# Patient Record
Sex: FEMALE | Race: BLACK OR AFRICAN AMERICAN | Employment: UNEMPLOYED | URBAN - METROPOLITAN AREA
[De-identification: names, ages, dates, MRNs, and addresses within clinical notes are randomized per-mention and may not be internally consistent; named-entity substitution may affect disease eponyms.]

---

## 2021-07-30 ENCOUNTER — ANESTHESIA EVENT (OUTPATIENT)
Dept: LABOR AND DELIVERY | Age: 31
DRG: 560 | End: 2021-07-30
Payer: MEDICAID

## 2021-07-30 ENCOUNTER — ANESTHESIA (OUTPATIENT)
Dept: LABOR AND DELIVERY | Age: 31
DRG: 560 | End: 2021-07-30
Payer: MEDICAID

## 2021-07-30 ENCOUNTER — HOSPITAL ENCOUNTER (INPATIENT)
Age: 31
LOS: 2 days | Discharge: HOME | DRG: 560 | End: 2021-08-01
Attending: OBSTETRICS & GYNECOLOGY | Admitting: OBSTETRICS & GYNECOLOGY
Payer: MEDICAID

## 2021-07-30 PROBLEM — Z34.90 PREGNANCY: Status: ACTIVE | Noted: 2021-07-30

## 2021-07-30 PROBLEM — Z37.9 NORMAL LABOR: Status: ACTIVE | Noted: 2021-07-30

## 2021-07-30 LAB
A1 MICROGLOB PLACENTAL VAG QL: POSITIVE
ABO + RH BLD: NORMAL
ALBUMIN SERPL-MCNC: 2.4 G/DL (ref 3.5–5)
ALBUMIN/GLOB SERPL: 0.6 {RATIO} (ref 1.2–3.5)
ALP SERPL-CCNC: 79 U/L (ref 50–130)
ALT SERPL-CCNC: 15 U/L (ref 12–65)
AMPHET UR QL SCN: NEGATIVE
ANION GAP SERPL CALC-SCNC: 7 MMOL/L (ref 7–16)
AST SERPL-CCNC: 14 U/L (ref 15–37)
BARBITURATES UR QL SCN: NEGATIVE
BASE EXCESS BLD CALC-SCNC: 0.7 MMOL/L
BENZODIAZ UR QL: NEGATIVE
BILIRUB SERPL-MCNC: 0.2 MG/DL (ref 0.2–1.1)
BLOOD GROUP ANTIBODIES SERPL: NORMAL
BUN SERPL-MCNC: 9 MG/DL (ref 6–23)
CALCIUM SERPL-MCNC: 9 MG/DL (ref 8.3–10.4)
CANNABINOIDS UR QL SCN: POSITIVE
CHLORIDE SERPL-SCNC: 107 MMOL/L (ref 98–107)
CO2 SERPL-SCNC: 25 MMOL/L (ref 21–32)
COCAINE UR QL SCN: NEGATIVE
CONTROL LINE PRESENT?: NORMAL
CREAT SERPL-MCNC: 0.98 MG/DL (ref 0.6–1)
ERYTHROCYTE [DISTWIDTH] IN BLOOD BY AUTOMATED COUNT: 15.6 % (ref 11.9–14.6)
EXPIRATION DATE: NORMAL
GLOBULIN SER CALC-MCNC: 3.8 G/DL (ref 2.3–3.5)
GLUCOSE SERPL-MCNC: 85 MG/DL (ref 65–100)
HCO3 BLD-SCNC: 28 MMOL/L (ref 22–26)
HCT VFR BLD AUTO: 27.8 % (ref 35.8–46.3)
HGB BLD-MCNC: 9.4 G/DL (ref 11.7–15.4)
INTERNAL NEGATIVE CONTROL: NORMAL
KIT LOT NO.: NORMAL
MCH RBC QN AUTO: 27.4 PG (ref 26.1–32.9)
MCHC RBC AUTO-ENTMCNC: 33.8 G/DL (ref 31.4–35)
MCV RBC AUTO: 81 FL (ref 79.6–97.8)
METHADONE UR QL: NEGATIVE
NRBC # BLD: 0 K/UL (ref 0–0.2)
OPIATES UR QL: NEGATIVE
PCO2 BLDCO: 55 MMHG (ref 32–68)
PCP UR QL: NEGATIVE
PH BLDCO: 7.32 [PH] (ref 7.15–7.38)
PLATELET # BLD AUTO: 305 K/UL (ref 150–450)
PMV BLD AUTO: 10.5 FL (ref 9.4–12.3)
PO2 BLDCO: 14 MMHG
POTASSIUM SERPL-SCNC: 4.2 MMOL/L (ref 3.5–5.1)
PROT SERPL-MCNC: 6.2 G/DL (ref 6.3–8.2)
RBC # BLD AUTO: 3.43 M/UL (ref 4.05–5.2)
SAO2 % BLD: 14.4 % (ref 95–98)
SERVICE CMNT-IMP: ABNORMAL
SODIUM SERPL-SCNC: 139 MMOL/L (ref 136–145)
SPECIMEN EXP DATE BLD: NORMAL
SPECIMEN TYPE: ABNORMAL
WBC # BLD AUTO: 13.8 K/UL (ref 4.3–11.1)

## 2021-07-30 PROCEDURE — 77030014125 HC TY EPDRL BBMI -B: Performed by: ANESTHESIOLOGY

## 2021-07-30 PROCEDURE — 74011250636 HC RX REV CODE- 250/636: Performed by: ANESTHESIOLOGY

## 2021-07-30 PROCEDURE — 76060000078 HC EPIDURAL ANESTHESIA: Performed by: OBSTETRICS & GYNECOLOGY

## 2021-07-30 PROCEDURE — 84112 EVAL AMNIOTIC FLUID PROTEIN: CPT | Performed by: OBSTETRICS & GYNECOLOGY

## 2021-07-30 PROCEDURE — 65270000029 HC RM PRIVATE

## 2021-07-30 PROCEDURE — 75410000000 HC DELIVERY VAGINAL/SINGLE: Performed by: OBSTETRICS & GYNECOLOGY

## 2021-07-30 PROCEDURE — 82803 BLOOD GASES ANY COMBINATION: CPT

## 2021-07-30 PROCEDURE — 74011250636 HC RX REV CODE- 250/636: Performed by: OBSTETRICS & GYNECOLOGY

## 2021-07-30 PROCEDURE — 74011250637 HC RX REV CODE- 250/637: Performed by: OBSTETRICS & GYNECOLOGY

## 2021-07-30 PROCEDURE — 74011000250 HC RX REV CODE- 250: Performed by: ANESTHESIOLOGY

## 2021-07-30 PROCEDURE — 00HU33Z INSERTION OF INFUSION DEVICE INTO SPINAL CANAL, PERCUTANEOUS APPROACH: ICD-10-PCS | Performed by: ANESTHESIOLOGY

## 2021-07-30 PROCEDURE — 80053 COMPREHEN METABOLIC PANEL: CPT

## 2021-07-30 PROCEDURE — 75410000002 HC LABOR FEE PER 1 HR: Performed by: OBSTETRICS & GYNECOLOGY

## 2021-07-30 PROCEDURE — 75410000003 HC RECOV DEL/VAG/CSECN EA 0.5 HR: Performed by: OBSTETRICS & GYNECOLOGY

## 2021-07-30 PROCEDURE — 86901 BLOOD TYPING SEROLOGIC RH(D): CPT

## 2021-07-30 PROCEDURE — 80307 DRUG TEST PRSMV CHEM ANLYZR: CPT

## 2021-07-30 PROCEDURE — A4300 CATH IMPL VASC ACCESS PORTAL: HCPCS | Performed by: ANESTHESIOLOGY

## 2021-07-30 PROCEDURE — 99285 EMERGENCY DEPT VISIT HI MDM: CPT

## 2021-07-30 PROCEDURE — 74011000258 HC RX REV CODE- 258: Performed by: OBSTETRICS & GYNECOLOGY

## 2021-07-30 PROCEDURE — 85027 COMPLETE CBC AUTOMATED: CPT

## 2021-07-30 PROCEDURE — 74011250636 HC RX REV CODE- 250/636: Performed by: NURSE ANESTHETIST, CERTIFIED REGISTERED

## 2021-07-30 PROCEDURE — 59025 FETAL NON-STRESS TEST: CPT

## 2021-07-30 PROCEDURE — 36415 COLL VENOUS BLD VENIPUNCTURE: CPT

## 2021-07-30 RX ORDER — LANOLIN ALCOHOL/MO/W.PET/CERES
1 CREAM (GRAM) TOPICAL
Status: DISCONTINUED | OUTPATIENT
Start: 2021-07-31 | End: 2021-08-01 | Stop reason: HOSPADM

## 2021-07-30 RX ORDER — ROPIVACAINE HYDROCHLORIDE 2 MG/ML
INJECTION, SOLUTION EPIDURAL; INFILTRATION; PERINEURAL
Status: DISCONTINUED | OUTPATIENT
Start: 2021-07-30 | End: 2021-07-30 | Stop reason: HOSPADM

## 2021-07-30 RX ORDER — IBUPROFEN 400 MG/1
400 TABLET ORAL
Status: DISCONTINUED | OUTPATIENT
Start: 2021-07-30 | End: 2021-08-01 | Stop reason: HOSPADM

## 2021-07-30 RX ORDER — NALOXONE HYDROCHLORIDE 0.4 MG/ML
0.4 INJECTION, SOLUTION INTRAMUSCULAR; INTRAVENOUS; SUBCUTANEOUS AS NEEDED
Status: DISCONTINUED | OUTPATIENT
Start: 2021-07-30 | End: 2021-08-01 | Stop reason: HOSPADM

## 2021-07-30 RX ORDER — ONDANSETRON 4 MG/1
4 TABLET, ORALLY DISINTEGRATING ORAL
Status: ACTIVE | OUTPATIENT
Start: 2021-07-30 | End: 2021-07-31

## 2021-07-30 RX ORDER — SODIUM CHLORIDE 0.9 % (FLUSH) 0.9 %
5-40 SYRINGE (ML) INJECTION EVERY 8 HOURS
Status: DISCONTINUED | OUTPATIENT
Start: 2021-07-30 | End: 2021-07-30

## 2021-07-30 RX ORDER — OXYTOCIN/RINGER'S LACTATE 30/500 ML
0-20 PLASTIC BAG, INJECTION (ML) INTRAVENOUS
Status: DISCONTINUED | OUTPATIENT
Start: 2021-07-30 | End: 2021-07-30 | Stop reason: SDUPTHER

## 2021-07-30 RX ORDER — OXYTOCIN/RINGER'S LACTATE 30/500 ML
87.3 PLASTIC BAG, INJECTION (ML) INTRAVENOUS AS NEEDED
Status: DISCONTINUED | OUTPATIENT
Start: 2021-07-30 | End: 2021-08-01 | Stop reason: HOSPADM

## 2021-07-30 RX ORDER — EPHEDRINE SULFATE/0.9% NACL/PF 50 MG/5 ML
SYRINGE (ML) INTRAVENOUS AS NEEDED
Status: DISCONTINUED | OUTPATIENT
Start: 2021-07-30 | End: 2021-07-30 | Stop reason: HOSPADM

## 2021-07-30 RX ORDER — MINERAL OIL
120 OIL (ML) ORAL
Status: COMPLETED | OUTPATIENT
Start: 2021-07-30 | End: 2021-07-30

## 2021-07-30 RX ORDER — ONDANSETRON 2 MG/ML
4 INJECTION INTRAMUSCULAR; INTRAVENOUS
Status: DISCONTINUED | OUTPATIENT
Start: 2021-07-30 | End: 2021-07-30 | Stop reason: HOSPADM

## 2021-07-30 RX ORDER — DIPHENHYDRAMINE HCL 25 MG
25 CAPSULE ORAL
Status: DISCONTINUED | OUTPATIENT
Start: 2021-07-30 | End: 2021-08-01 | Stop reason: HOSPADM

## 2021-07-30 RX ORDER — ROPIVACAINE HYDROCHLORIDE 5 MG/ML
INJECTION, SOLUTION EPIDURAL; INFILTRATION; PERINEURAL AS NEEDED
Status: DISCONTINUED | OUTPATIENT
Start: 2021-07-30 | End: 2021-07-30 | Stop reason: HOSPADM

## 2021-07-30 RX ORDER — SODIUM CHLORIDE 0.9 % (FLUSH) 0.9 %
5-40 SYRINGE (ML) INJECTION AS NEEDED
Status: DISCONTINUED | OUTPATIENT
Start: 2021-07-30 | End: 2021-07-30

## 2021-07-30 RX ORDER — HYDROCODONE BITARTRATE AND ACETAMINOPHEN 5; 325 MG/1; MG/1
1 TABLET ORAL
Status: DISCONTINUED | OUTPATIENT
Start: 2021-07-30 | End: 2021-08-01 | Stop reason: HOSPADM

## 2021-07-30 RX ORDER — SODIUM CHLORIDE 0.9 % (FLUSH) 0.9 %
5-40 SYRINGE (ML) INJECTION EVERY 8 HOURS
Status: DISCONTINUED | OUTPATIENT
Start: 2021-07-30 | End: 2021-08-01 | Stop reason: HOSPADM

## 2021-07-30 RX ORDER — DEXTROSE, SODIUM CHLORIDE, SODIUM LACTATE, POTASSIUM CHLORIDE, AND CALCIUM CHLORIDE 5; .6; .31; .03; .02 G/100ML; G/100ML; G/100ML; G/100ML; G/100ML
125 INJECTION, SOLUTION INTRAVENOUS CONTINUOUS
Status: DISCONTINUED | OUTPATIENT
Start: 2021-07-30 | End: 2021-07-30

## 2021-07-30 RX ORDER — LIDOCAINE HYDROCHLORIDE 10 MG/ML
1 INJECTION INFILTRATION; PERINEURAL
Status: DISCONTINUED | OUTPATIENT
Start: 2021-07-30 | End: 2021-07-30 | Stop reason: HOSPADM

## 2021-07-30 RX ORDER — ACETAMINOPHEN 325 MG/1
650 TABLET ORAL
Status: DISCONTINUED | OUTPATIENT
Start: 2021-07-30 | End: 2021-08-01 | Stop reason: HOSPADM

## 2021-07-30 RX ORDER — SODIUM CHLORIDE 0.9 % (FLUSH) 0.9 %
5-40 SYRINGE (ML) INJECTION AS NEEDED
Status: DISCONTINUED | OUTPATIENT
Start: 2021-07-30 | End: 2021-08-01 | Stop reason: HOSPADM

## 2021-07-30 RX ORDER — DEXTROSE, SODIUM CHLORIDE, SODIUM LACTATE, POTASSIUM CHLORIDE, AND CALCIUM CHLORIDE 5; .6; .31; .03; .02 G/100ML; G/100ML; G/100ML; G/100ML; G/100ML
125 INJECTION, SOLUTION INTRAVENOUS CONTINUOUS
Status: DISCONTINUED | OUTPATIENT
Start: 2021-07-30 | End: 2021-08-01 | Stop reason: HOSPADM

## 2021-07-30 RX ORDER — MORPHINE SULFATE 10 MG/ML
5 INJECTION, SOLUTION INTRAMUSCULAR; INTRAVENOUS
Status: DISCONTINUED | OUTPATIENT
Start: 2021-07-30 | End: 2021-08-01 | Stop reason: HOSPADM

## 2021-07-30 RX ORDER — DOCUSATE SODIUM 100 MG/1
100 CAPSULE, LIQUID FILLED ORAL 2 TIMES DAILY
Status: DISCONTINUED | OUTPATIENT
Start: 2021-07-30 | End: 2021-08-01 | Stop reason: HOSPADM

## 2021-07-30 RX ORDER — BUTORPHANOL TARTRATE 2 MG/ML
1 INJECTION INTRAMUSCULAR; INTRAVENOUS
Status: DISCONTINUED | OUTPATIENT
Start: 2021-07-30 | End: 2021-07-30 | Stop reason: HOSPADM

## 2021-07-30 RX ORDER — BUTORPHANOL TARTRATE 2 MG/ML
1 INJECTION INTRAMUSCULAR; INTRAVENOUS
Status: DISCONTINUED | OUTPATIENT
Start: 2021-07-30 | End: 2021-07-30

## 2021-07-30 RX ORDER — OXYTOCIN/RINGER'S LACTATE 30/500 ML
10 PLASTIC BAG, INJECTION (ML) INTRAVENOUS AS NEEDED
Status: DISCONTINUED | OUTPATIENT
Start: 2021-07-30 | End: 2021-08-01 | Stop reason: HOSPADM

## 2021-07-30 RX ORDER — OXYTOCIN/RINGER'S LACTATE 30/500 ML
0-20 PLASTIC BAG, INJECTION (ML) INTRAVENOUS
Status: DISCONTINUED | OUTPATIENT
Start: 2021-07-30 | End: 2021-08-01 | Stop reason: HOSPADM

## 2021-07-30 RX ORDER — PRENATAL VIT 96/IRON FUM/FOLIC 27MG-0.8MG
1 TABLET ORAL DAILY
Status: DISCONTINUED | OUTPATIENT
Start: 2021-07-31 | End: 2021-08-01 | Stop reason: HOSPADM

## 2021-07-30 RX ORDER — LIDOCAINE HYDROCHLORIDE 20 MG/ML
JELLY TOPICAL
Status: DISCONTINUED | OUTPATIENT
Start: 2021-07-30 | End: 2021-07-30 | Stop reason: HOSPADM

## 2021-07-30 RX ADMIN — ROPIVACAINE HYDROCHLORIDE 13 MG: 5 INJECTION, SOLUTION EPIDURAL; INFILTRATION; PERINEURAL at 03:50

## 2021-07-30 RX ADMIN — ACETAMINOPHEN 650 MG: 325 TABLET, FILM COATED ORAL at 20:53

## 2021-07-30 RX ADMIN — SODIUM CHLORIDE 2.5 MILLION UNITS: 9 INJECTION, SOLUTION INTRAVENOUS at 10:33

## 2021-07-30 RX ADMIN — SODIUM CHLORIDE 2.5 MILLION UNITS: 9 INJECTION, SOLUTION INTRAVENOUS at 06:27

## 2021-07-30 RX ADMIN — SODIUM CHLORIDE 5 MILLION UNITS: 900 INJECTION INTRAVENOUS at 02:35

## 2021-07-30 RX ADMIN — IBUPROFEN 400 MG: 400 TABLET, FILM COATED ORAL at 18:06

## 2021-07-30 RX ADMIN — DOCUSATE SODIUM 100 MG: 100 CAPSULE ORAL at 18:03

## 2021-07-30 RX ADMIN — MINERAL OIL 120 ML: 1000 LIQUID ORAL at 11:40

## 2021-07-30 RX ADMIN — Medication 1 MILLI-UNITS/MIN: at 07:21

## 2021-07-30 RX ADMIN — Medication 5 MG: at 04:10

## 2021-07-30 RX ADMIN — ROPIVACAINE HYDROCHLORIDE 10 ML/HR: 2 INJECTION, SOLUTION EPIDURAL; INFILTRATION at 03:48

## 2021-07-30 RX ADMIN — Medication 10 MG: at 04:05

## 2021-07-30 NOTE — PROGRESS NOTES
SW consult received as patient is unassigned and UDS + for [facility] on admission. Phone call to RN to request that UDS be ordered for  after delivery. Patient is in labor at this time.  will follow-up with patient after delivery.     EMILY Nathan  [address] [facility]   [phone]

## 2021-07-30 NOTE — PROGRESS NOTES
Pt is tolerating labor well with epidural in place. She has no complaints. IV PCN is infusing. FHR is reactive. Ctx are every 5 minutes. Cervix is 3cm. Will start pitocin augmentation per the protocol.

## 2021-07-30 NOTE — PROGRESS NOTES
Received care of pt from [address] [place] [address].   Pt comfortable with epidural.

## 2021-07-30 NOTE — PROGRESS NOTES
C/o increase pressure  Dr. Matteo Zamora at bedside  SVE per MD c/c/+2  Orders to set up for vaginal delivery

## 2021-07-30 NOTE — PROGRESS NOTES
Patient presents to triage via EMS with complaints of SROM clear fluid around 2230. Patient states she has received prenatal care in [place]. Patient reports good fetal movement and irregular contractions. States her GBS was positive.

## 2021-07-30 NOTE — ANESTHESIA PROCEDURE NOTES
Epidural Block    Patient location during procedure: OB  Start time: 7/30/2021 3:43 AM  End time: 7/30/2021 3:50 AM  Reason for block: labor epidural  Staffing  Performed: attending   Anesthesiologist: Torrey Thomas MD  Preanesthetic Checklist  Completed: patient identified, risks and benefits discussed, surgical consent, pre-op evaluation and timeout performed  Block Placement  Patient position: sitting  Prep: ChloraPrep  Sterility prep: cap, drape, gloves, hand and mask  Sedation level: no sedation  Patient monitoring: continuous pulse oximetry and heart rate  Approach: midline  Location: lumbar  Lumbar location: L3-L4  Epidural  Loss of resistance technique: saline  Guidance: landmark technique  Needle  Needle type: Tuohy   Needle gauge: 17 G  Needle length: 10 cm  Needle insertion depth: 5 cm  Catheter type: end hole  Catheter size: 19 G  Catheter at skin depth: 11 cm  Catheter securement method: clear occlusive dressing, liquid medical adhesive and surgical tape  Test dose: negative  Assessment  Block outcome: pain improved  Number of attempts: 1  Procedure assessment: patient tolerated procedure well with no immediate complications

## 2021-07-30 NOTE — PROGRESS NOTES
Pt admitted to room 432. EFM on and tracing. IV started, labs collected and sent. Consents signed. POC reviewed. Bed low and locked, call light within reach. FOB at bedside. No needs/concerns at this time.

## 2021-07-30 NOTE — ROUTINE PROCESS
SBAR OUT Report: Mother    Verbal report given to BONG Gutierrez RN on this patient, who is now being transferred to MI (unit) for routine progression of care. The patient is not wearing a green \"Anesthesia-Duramorph\" band. Report consisted of patient's Situation, Background, Assessment and Recommendations (SBAR). [place] ID bands were compared with the identification form, and verified with the patient and receiving nurse. Information from the SBAR and [place] and the [address] [facility] Report was reviewed with the receiving nurse; opportunity for questions and clarification provided.

## 2021-07-30 NOTE — H&P
History & Physical    Name: Pieter Schwarz MRN: [id number]  SSN: [id number]    YOB: 1990  Age: 33 y.o. Sex: female        Subjective: Pt is 32 y/o  at 39w4d who presents with SROM 2 hours ago and irregular and mild uterine ctx. Pt notes good FM. She denies VB, fevers, chills, UTI, PEC, or C-19 symptoms. Pt states that she has had SVDs , uncomplicated, and at term times 6. Pt's prenatal care has been in [place], [place]. Pt has her prenatal labs on her phone ([address]) and they are unremarkable other than anemia and an equivocal rubella titer. Estimated Date of Delivery: 21  OB History    Para Term  AB Living   8 7 7     7   SAB TAB Ectopic Molar Multiple Live Births                    # Outcome Date GA Lbr Gabino/2nd Weight Sex Delivery Anes PTL Lv   8 Current            7 Term            6 Term            5 Term            4 Term            3 Term            2 Term            1 Term                   Please see prenatal records for details. No past medical history on file. No past surgical history on file. Social History     Occupational History    Not on file   Tobacco Use    Smoking status: Not on file   Substance and Sexual Activity    Alcohol use: Not on file    Drug use: Not on file    Sexual activity: Not on file     No family history on file. No Known Allergies  Prior to Admission medications    Medication Sig Start Date End Date Taking? Authorizing Provider   PNV Comb #2-Iron-FA-Omega 3 29-1-400 mg cmpk Take  by mouth. Yes Provider, Historical   Iron BisGl &PS [id number]-Ca 151-46-34-9 mg-mg-mcg-mg cap Take  by mouth.    Yes Provider, Historical        Review of Systems:  Constitutional:No headache, fever  Cardiac:   No chest pain      Resp: No cough or shortness of breath     GI:   No nausea/vomiting, diarrhea, abdominal pain    :   No dysuria  Neuro:     No vision changes, headache      Objective:     Vitals:  Vitals:    21 0056 21 0058   BP: 125/73    Pulse: 93    Resp: 20    Weight:  108 kg (238 lb)   Height:  5' 9\" (1.753 m)        Physical Exam:  Patient without distress. Heart: Regular rate and rhythm  Lung: clear to auscultation throughout lung fields, no wheezes, no rales, no rhonchi and normal respiratory effort  Back: costovertebral angle tenderness absent  Abdomen: soft, nontender  Fundus: soft and non tender  Cervical Exam: 2 cm dilated    50% effaced    -2 station    Presenting Part: cephalic  Lower Extremities:  - Edema No  Membranes:  Spontaneous Rupture of Membranes; Amniotic Fluid: clear fluid  Fetal Heart Rate: Baseline: 140 per minute  Variability: moderate  Accelerations: yes  Decelerations: variable  Uterine contractions: regular, every 4-5 minutes    Prenatal Labs:   No results found for: RUBELLAEXT, GRBSEXT, HBSAGEXT, HIVEXT, RPREXT, GONNOEXT, CHLAMEXT      Assessment/Plan:     Ms. Raji Long is a [phone] seen with pregnancy at 39w4d for Presumptive ROM . GBS is positive    Plan:     Admit for labor management. Start IV PCN per the GBS protocol. Obtain pt's prenatal records.

## 2021-07-30 NOTE — ANESTHESIA PREPROCEDURE EVALUATION
Relevant Problems   No relevant active problems       Anesthetic History   No history of anesthetic complications            Review of Systems / Medical History  Patient summary reviewed and pertinent labs reviewed    Pulmonary  Within defined limits                 Neuro/Psych   Within defined limits           Cardiovascular                  Exercise tolerance: >4 METS     GI/Hepatic/Renal  Within defined limits              Endo/Other  Within defined limits           Other Findings              Physical Exam    Airway  Mallampati: I  TM Distance: 4 - 6 cm  Neck ROM: normal range of motion   Mouth opening: Normal     Cardiovascular  Regular rate and rhythm,  S1 and S2 normal,  no murmur, click, rub, or gallop  Rhythm: regular  Rate: normal         Dental  No notable dental hx       Pulmonary  Breath sounds clear to auscultation               Abdominal  GI exam deferred       Other Findings            Anesthetic Plan    ASA: 2  Anesthesia type: epidural            Anesthetic plan and risks discussed with: Patient

## 2021-07-30 NOTE — PROGRESS NOTES
1440:  SW met with patient while social distancing w/appropriate PPE. Patient states that she received prenatal care with Dr. Alhaji Lloyd. [facility]). Patient reports that she lives in [place], but traveled to [place] for prenatal care as [facility] accepted my insurance. \"  SW'r educated patient that [facility] accepts [employer] as well. Patient reports consistent prenatal care throughout pregnancy. Patient was informed that her UDS on admission was positive for THC. UDS on  awaiting urine sample. Umbilical drug screen pending. Patient states that she hasn't used marijuana in 3 months. She used marijuana because \"it was the only way I could eat. \"  Patient denies any history/current involvement of DSS. Patient was informed that a DSS report would be made today. Patient reports having a car seat, crib, and all needed items to care for . Patient does not receive WIC. Verbal education/pamphlet provided on [facility] program.  Patient receives SNAP/Food [place]. Patient has a total of 7 children whom are in the same household in [place]:  Boone Parents (04)  [place] (07)  Jose Guadalupe Cohn (10/23/08)  Burgess Prader (11/09/10)  Mohinder Nur (12)  Jud Brambila (16)  Arina Dee (21) - No name provided for [address] [facility]  [phone] - patient cell    1500:  Phone call to [employer] at [phone]. Report provided to [place]. Additionally, [place] was provided with phone # for weekend  Sierra Rasmussen, [phone]). 1620:  Second phone call placed to [employer] at [phone]. Update provided to Shelia that baby's UDS was positive for THC.     BARTOLO Ellington-BUNNY  [address] [facility]   [phone]

## 2021-07-30 NOTE — PROGRESS NOTES
SBAR IN Report: Mother    Verbal report received from Lewis Holter, RN  on this patient, who is now being transferred from L&D  for routine progression of care. The patient is not wearing a green \"Anesthesia-Duramorph\" band. Report consisted of patient's Situation, Background, Assessment and Recommendations (SBAR). [place] ID bands were compared with the identification form, and verified with the patient and transferring nurse. Information from the SBAR and the [place] Report was reviewed with the transferring nurse; opportunity for questions and clarification provided.

## 2021-07-30 NOTE — PROGRESS NOTES
Admission assessment complete as noted. Patient oriented to room and unit. Plan of care reviewed and patient verbalizes understanding. Questions encouraged and answered. Patent encouraged to call for needs or concerns. Safety Teaching reviewed:   1. Hand hygiene prior to handling the infant. 2. Use of bulb syringe. 3. Bracelets with matching numbers are placed on mother and infant  4. An infant security tag  [facility]) is placed on the infant's ankle and monitored  5. All OB nurses wear pink Employee badges - do not give your baby to anyone without proper identification. 6. Never leave the baby alone in the room. 7. The infant should be placed on their back to sleep. on a firm mattress. No toys should be placed in the crib. (safe sleep video offered to view)  8. Never shake the baby (video offered to view)  9. Infant fall prevention - do not sleep with the baby, and place the baby in the crib while ambulating. 8. Mother and Baby Care booklet given to Mother.

## 2021-07-30 NOTE — L&D DELIVERY NOTE
Patient progressed with pitocin augmentation to completely dilated. Received adequate gbs prophylaxis. Had forebag even with previous srom. forebag ruptured with allis clamp. C/C/+3. Patient delivered OA over IP with pushing over 3 contractions. Body delivered easily. Mouth and nares bulb suctioned and baby dried. Vigorous female. Delayed cord clamping performed and baby taken to warmer at mothers request. Placenta s/i/3vc. No vaginal or cervical laceration. Bleeding minimal. Pitocin given at cord clamp. Delivery Summary    Patient: Bong Euceda MRN: [id number]  SSN: [id number]    YOB: 1990  Age: 33 y.o. Sex: female       Information for the patient's :  Caesar Magana [[id number]]       Labor Events:    Labor: No    Steroids: None   Cervical Ripening Date/Time:       Cervical Ripening Type: None   Antibiotics During Labor: Yes   Rupture Identifier:      Rupture Date/Time: 2021 10:30 PM   Rupture Type: SROM   Amniotic Fluid Volume:      Amniotic Fluid Description: Clear    Amniotic Fluid Odor: None    Induction:         Induction Date/Time:        Indications for Induction:      Augmentation:     Augmentation Date/Time:      Indications for Augmentation:     Labor complications:          Additional complications:        Delivery Events:  Indications For Episiotomy:     Episiotomy:     Perineal Laceration(s):     Repaired:     Periurethral Laceration Location:      Repaired:     Labial Laceration Location:     Repaired:     Sulcal Laceration Location:     Repaired:     Vaginal Laceration Location:     Repaired:     Cervical Laceration Location:     Repaired:     Repair Suture:     Number of Repair Packets:     Estimated Blood Loss (ml):  ml   Quantitative Blood Loss (ml)                Delivery Date: 2021    Delivery Time: 11:36 AM  Delivery Type: Vaginal, Spontaneous  Sex:  Female    Gestational Age: 43w3d   Delivery Clinician:  Rosana Cabrera Seamus  Living Status: Living   Delivery Location: L&D 432          APGARS  One minute Five minutes Ten minutes   Skin color: 1   1        Heart rate: 2   2        Grimace: 2   2        Muscle tone: 2   2        Breathin   2        Totals: 9   9            Presentation: Vertex    Position:        Resuscitation Method:  Suctioning-bulb; Tactile Stimulation     Meconium Stained:        Cord Information: 3 Vessels  Complications:    Cord around:    Delayed cord clamping? Yes  Cord clamped date/time:   Disposition of Cord Blood: Lab    Blood Gases Sent?:      Placenta:  Date/Time: 2021 11:39 AM  Removal: Spontaneous      Appearance: Normal     Casanova Measurements:  Birth Weight: 2.96 kg      Birth Length: 50 cm      Head Circumference: 34 cm      Chest Circumference: 32 cm     Abdominal Girth: Other Providers:   Chapo MCINTYRE JOCELYN R;COLLEEN DELACRUZ;CHIP SMITH;YAYO MACARIO, Obstetrician;Primary Nurse;Staff Nurse;Charge Nurse;Scrub Tech           Group B Strep: No results found for: GRBSEXT, GRBSEXT  Information for the patient's :  Gurpreet Liao [[id number]]     Lab Results   Component Value Date/Time    ABO/Rh(D) PENDING 2021 11:36 AM    TANMAY IgG NEG 2021 11:36 AM      Recent Labs     21  1155   PCO2CB 55   PO2CB 14   HCO3I 28.0*   SO2I 14.4*   SPECTI ARTERIAL CORD   PHICB 7.32      Arleen Bryant MD

## 2021-07-30 NOTE — PROGRESS NOTES
Cole Ward at bedside at 1374. IVETH Marquez at bedside at 9094    Assisted pt to sitting up on bedside at 0342. Timeout completed at [phone] with MD, IVETH and myself at bedside. Test dose given at 0347. Negative reaction. Dose given at 0352. Pt assisted to lying back in left tilt position. See anesthesia record for details. See vital sign flow sheet for BP. Tolerated procedure well.

## 2021-07-30 NOTE — PROGRESS NOTES
Patient up to bathroom with RN assistance. Yadira-care taught and completed. Questions encouraged and answered. Patient ambulating without difficulty, encouraged to call for needs or concerns. Verbalizes understanding.

## 2021-07-31 LAB
HCT VFR BLD AUTO: 26.3 % (ref 35.8–46.3)
HGB BLD-MCNC: 8.7 G/DL (ref 11.7–15.4)

## 2021-07-31 PROCEDURE — 36415 COLL VENOUS BLD VENIPUNCTURE: CPT

## 2021-07-31 PROCEDURE — 74011250637 HC RX REV CODE- 250/637: Performed by: OBSTETRICS & GYNECOLOGY

## 2021-07-31 PROCEDURE — 2709999900 HC NON-CHARGEABLE SUPPLY

## 2021-07-31 PROCEDURE — 65270000029 HC RM PRIVATE

## 2021-07-31 PROCEDURE — 85014 HEMATOCRIT: CPT

## 2021-07-31 RX ADMIN — IBUPROFEN 400 MG: 400 TABLET, FILM COATED ORAL at 00:31

## 2021-07-31 RX ADMIN — DOCUSATE SODIUM 100 MG: 100 CAPSULE ORAL at 08:18

## 2021-07-31 RX ADMIN — FERROUS SULFATE TAB 325 MG (65 MG ELEMENTAL FE) 325 MG: 325 (65 FE) TAB at 08:18

## 2021-07-31 RX ADMIN — ACETAMINOPHEN 650 MG: 325 TABLET, FILM COATED ORAL at 05:49

## 2021-07-31 RX ADMIN — PRENATAL VIT W/ FE FUMARATE-FA TAB 27-0.8 MG 1 TABLET: 27-0.8 TAB at 08:18

## 2021-07-31 RX ADMIN — IBUPROFEN 400 MG: 400 TABLET, FILM COATED ORAL at 20:16

## 2021-07-31 NOTE — PROGRESS NOTES
07/31/21 0031   Pain Assessment   Pain Scale 1 Numeric (0 - 10)   Pain Intensity 1 2   Pain Location 1 Abdomen;Perineum   Pain Description 1 Aching;Cramping; Sore   Pain Intervention(s) 1 Medication (see MAR)   POSS Scale   Opioid Sedation Scale 1     PRN Motrin 800mg for pain.

## 2021-07-31 NOTE — PROGRESS NOTES
07/30/21 2053   Pain Assessment   Pain Scale 1 Numeric (0 - 10)   Pain Intensity 1 2   Pain Location 1 Abdomen;Perineum   Pain Description 1 Aching;Cramping; Sore   Pain Intervention(s) 1 Medication (see MAR)   POSS Scale   Opioid Sedation Scale 1     PRN Tylenol 650 mg for pain.

## 2021-07-31 NOTE — PROGRESS NOTES
Les Peters ([phone])  from [address] [place] [address] was here and spoke with patient. DSS Planning Document in chart which says that the minor child Iram Porter, born on 7/30/21, will discharge to [facility] with in-home protectors Radha Sailors or Chandler Rivera, [phone] or [phone]. Meconium results will be emailed to Key Beebe at  [email or web address].

## 2021-07-31 NOTE — PROGRESS NOTES
Shift assessment complete see flowsheet. Discussed today plan of care with parents. Bleeding precautions given. Pt denies pain at this time and declines pain medication. Pt to call with needs/concerns. Pt in bed with call light in reach. No s/s of distress noted.

## 2021-07-31 NOTE — PROGRESS NOTES
1115-pt c/o \"soreness\" to inner left knee. Pt states she did use the peanut while in labor. 1128-Gale Casanova at desk and informed of the above. No new orders received. MD to assess pt.

## 2021-07-31 NOTE — PROGRESS NOTES
Report received from Jessi Cast RN care assumed. Pt sitting up in bed with call light in reach. No complaints at this time.

## 2021-07-31 NOTE — PROGRESS NOTES
07/31/21 0549   Pain Assessment   Pain Scale 1 Numeric (0 - 10)   Pain Intensity 1 1   Pain Location 1 Abdomen;Perineum   Pain Description 1 Aching;Cramping; Sore   Pain Intervention(s) 1 Medication (see MAR)   POSS Scale   Opioid Sedation Scale 1     PRN Tylenol 650mg for pain.

## 2021-08-01 VITALS
RESPIRATION RATE: 18 BRPM | TEMPERATURE: 98.3 F | WEIGHT: 238 LBS | HEART RATE: 70 BPM | BODY MASS INDEX: 35.25 KG/M2 | HEIGHT: 69 IN | SYSTOLIC BLOOD PRESSURE: 123 MMHG | OXYGEN SATURATION: 99 % | DIASTOLIC BLOOD PRESSURE: 79 MMHG

## 2021-08-01 PROBLEM — O09.40 ANEMIA AFFECTING EIGHTH PREGNANCY: Chronic | Status: ACTIVE | Noted: 2021-08-01

## 2021-08-01 PROBLEM — O99.019 ANEMIA AFFECTING EIGHTH PREGNANCY: Chronic | Status: ACTIVE | Noted: 2021-08-01

## 2021-08-01 PROCEDURE — 74011250637 HC RX REV CODE- 250/637: Performed by: OBSTETRICS & GYNECOLOGY

## 2021-08-01 RX ADMIN — IBUPROFEN 400 MG: 400 TABLET, FILM COATED ORAL at 05:32

## 2021-08-01 RX ADMIN — PRENATAL VIT W/ FE FUMARATE-FA TAB 27-0.8 MG 1 TABLET: 27-0.8 TAB at 09:20

## 2021-08-01 RX ADMIN — FERROUS SULFATE TAB 325 MG (65 MG ELEMENTAL FE) 325 MG: 325 (65 FE) TAB at 09:19

## 2021-08-01 RX ADMIN — DOCUSATE SODIUM 100 MG: 100 CAPSULE ORAL at 09:19

## 2021-08-01 NOTE — PROGRESS NOTES
08/01/21 0532   Pain Assessment   Pain Scale 1 Numeric (0 - 10)   Pain Intensity 1 3   Pain Location 1 Abdomen;Perineum   Pain Description 1 Aching;Cramping; Sore   Pain Intervention(s) 1 Medication (see MAR)   POSS Scale   Opioid Sedation Scale 1     PRN Motrin 400mg for pain.

## 2021-08-01 NOTE — PROGRESS NOTES
Progress Note    Late entry for 2021                               Patient: Yarelis Guajardo MRN: [id number]  SSN: [id number]    YOB: 1990  Age: 33 y.o. Sex: female      Postpartum Day Number 1    Subjective:     Patient doing well postpartum without significant complaints. Voiding without difficulty. Patient reports normal lochia. . Breastfeeding: No     Objective:     Patient Vitals for the past 18 hrs:   Temp Pulse Resp BP SpO2   21 0757 98.3 °F (36.8 °C) 70 18 123/79 99 %   21 2330 97.9 °F (36.6 °C) 79 18 120/76 99 %   21 2017 98 °F (36.7 °C) 71 16 118/61 99 %        Temp (24hrs), Av.2 °F (36.8 °C), Min:97.9 °F (36.6 °C), Max:98.5 °F (36.9 °C)      Physical Exam:    Patient without distress. Breast: normal breast exam  Heart: Regular rate and rhythm  Lung: clear to auscultation throughout lung fields, no wheezes, no rales, no rhonchi and normal respiratory effort  Abdomen: soft, nontender  Fundus: firm and non tender  Lower Extremities:  - Edema No   - Patellar Reflexes: 2+ bilaterally    Lab/Data Review: All lab results for the last 24 hours reviewed. Assessment and Plan:     Patient appears to be having an uncomplicated postpartum course. Continue routine perineal care and maternal education.  Anticipate d/c    to see pt and decide on discharge planning for urine + for marijuana only

## 2021-08-01 NOTE — PROGRESS NOTES
Patient discharged to home per Dr. Amrit Mcneal orders. Discharge instructions reviewed with patient and pt given a copy. Questions encouraged and answered. Patient verbalizes understanding. Patient escorted by MIU staff (ANNMARIE Horton) to private vehicle. Pt declines w/c for d/c. Stable at discharge.

## 2021-08-01 NOTE — PROGRESS NOTES
Report received from Lucrecia Berrios RN care assumed. Pt in bed with call light in reach. No complaints at this time.

## 2021-08-01 NOTE — PROGRESS NOTES
Shift assessment complete see flowsheet. Discussed today plan of care with pt. Pt voiced understanding. Pt denies pain at this time. No s/s of distress noted. Pt states the soreness to her right knee has improved. Questions encouraged and answered. Pt to call with needs/concerns. Pt in bed with call light in reach.    EPDS 9

## 2021-08-01 NOTE — PROGRESS NOTES
07/31/21 2016   Pain Assessment   Pain Scale 1 Numeric (0 - 10)   Pain Intensity 1 3   Pain Location 1 Abdomen;Perineum   Pain Description 1 Aching;Cramping; Sore   Pain Intervention(s) 1 Medication (see MAR)   POSS Scale   Opioid Sedation Scale 1     PRN Motrin 400mg for pain.

## 2021-08-01 NOTE — DISCHARGE INSTRUCTIONS

## 2021-08-01 NOTE — PROGRESS NOTES
Progress Note                               Patient: Jessica Sims MRN: [id number]  SSN: [id number]    YOB: 1990  Age: 33 y.o. Sex: female      Postpartum Day Number 2    Subjective:     Patient doing well postpartum without significant complaints. Voiding without difficulty. Patient reports normal lochia. . Breastfeeding: No     Objective:     Patient Vitals for the past 18 hrs:   Temp Pulse Resp BP SpO2   21 0757 98.3 °F (36.8 °C) 70 18 123/79 99 %   21 2330 97.9 °F (36.6 °C) 79 18 120/76 99 %   21 2017 98 °F (36.7 °C) 71 16 118/61 99 %        Temp (24hrs), Av.2 °F (36.8 °C), Min:97.9 °F (36.6 °C), Max:98.5 °F (36.9 °C)      Physical Exam:    Patient without distress. Breast: normal breast exam and mildly enorged  Abdomen: soft, nontender  Lower Extremities:  - Edema No   - No evidence of DVT seen on physical exam.   - Patellar Reflexes: 2+ bilaterally    Lab/Data Review: All lab results for the last 24 hours reviewed. Assessment and Plan:     Patient appears to be having an uncomplicated postpartum course. Continue routine perineal care and maternal education. , Will discharge home today. Pt will follow up with her regular MD and will be given name of Dr. Claudette Civil at [facility] if she wants f/u here.

## 2021-08-01 NOTE — PROGRESS NOTES
Offered MMR vaccine to pt, pt refused and would like to verify with her OB office on her Rubella status. No records in chart to verify if pt is Rubella NI. Pt declines tdap as she thinks she had it administered at her OB office.

## 2021-08-01 NOTE — DISCHARGE SUMMARY
Antepartum Discharge Summary     Name: Roberto Carlos Cavazos MRN: [id number]  SSN: [id number]    YOB: 1990  Age: 33 y.o. Sex: female      Allergies: Patient has no known allergies. Admit Date: 7/30/2021    Discharge Date: 8/1/2021     Admitting Physician: Yudith Felton MD     Attending Physician:  Zack Puente MD     * Admission Diagnoses: Normal labor [O80, Z37.9]  Pregnancy [Z34.90]    * Discharge Diagnoses:   Hospital Problems as of 8/1/2021 Date Reviewed: 8/1/2021        Codes Class Noted - Resolved POA    * (Principal) Normal labor ICD-10-CM: Pratibha Pancoast, Z37.9  ICD-9-CM: 977  7/30/2021 - Present Unknown        Pregnancy ICD-10-CM: Z34.90  ICD-9-CM: V22.2  7/30/2021 - Present Unknown        Anemia affecting eighth pregnancy (Chronic) ICD-10-CM: O99.019, O09.40  ICD-9-CM: 648.23  8/1/2021 - Present Yes             Lab Results   Component Value Date/Time    ABO/Rh(D) O POSITIVE 07/30/2021 01:41 AM    There is no immunization history for the selected administration types on file for this patient. * Discharge Condition: good    * Procedures: vaginal delivery without complications      * Hospital Course:    - uncomplicated delivery    * Disposition: Home    Discharge Medications:   Current Discharge Medication List      CONTINUE these medications which have NOT CHANGED    Details   PNV Comb #2-Iron-FA-Omega 3 29-1-400 mg cmpk Take  by mouth. Iron BisGl &PS Ofh-T-X49-FA-Ca 033-35-67-7 mg-mg-mcg-mg cap Take  by mouth. * Follow-up Care/Patient Instructions:   Activity: Activity as tolerated and No sex for 4 weeks  Diet: Regular Diet  Wound Care: None needed    Follow-up Information     Follow up With Specialties Details Why Contact Martínez Moore DO Obstetrics & Gynecology, Gynecology, Obstetrics Call in 6 weeks  [address] [facility]  [phone]      None    None (314) Patient stated that they have no PCP

## 2021-08-02 LAB
BASE EXCESS BLD CALC-SCNC: 0.4 MMOL/L
HCO3 BLDV-SCNC: 24.6 MMOL/L (ref 23–28)
PCO2 BLDCO: 37 MMHG (ref 32–68)
PH BLDCO: 7.43 [PH] (ref 7.15–7.38)
PO2 BLDCO: 20 MMHG
SAO2 % BLDV: 35 % (ref 65–88)
SERVICE CMNT-IMP: ABNORMAL
SPECIMEN TYPE: ABNORMAL

## 2021-08-04 NOTE — PROGRESS NOTES
Umbilical drug screen + for THC. Copy of results securely emailed to [email or web address] and [email or web address].     EMILY Cox  [address]   [phone]

## 2021-08-06 NOTE — ADT AUTH CERT NOTES
NOTIFICATION OF LABOR AND DELIVERY     (ORIGINAL REQUEST FAXED ON 2021)     IF  THIS IS A DUPLICATE, PLEASE FAX BACK AUTH REF# & STATUS     UR CONTACT   LUIZA Gamboa January   [phone] FAX         ADMITTED 2021  BABY BORN 2021  BOTH MOM AND BABY DISCHARGED 2021    THANK YOU !!           [facility]     FACILITY NPI : [id number]       [facility]  SFE 4 MOTHER INFANT  [address] [place] [facility] [address] [phone]            Patient Name :Whit Nicholas   : 1990 (31 yrs)  MRN : [id number]     Patient Mailing Address [address] [place] [address] [66] , [address]                                                        .         Insurance Plan Payor: BLUE CHOICE Maria Alejandra Home / Plan: [place] [employer] Home / Product Type: Managed Care Medicaid /      Primary Coverage Subscriber ID : [id number]             Current Patient Class : INPATIENT  Admit Date : 2021     REQUESTED LEVEL OF CARE: INPATIENT [101]                                                           Diagnosis : Pregnancy                          ICD10 Code : Normal labor [O80, Z37.9]  Pregnancy [Z34.90]          Admitting and Attending Info:  Admitting Provider : Devora Reyna MD   NPI: [id number]  Admitting Provider Phone. [phone]  Admitting Provider Address:SAME AS FACILITY         Comments  Comment     Last edited by  on  [address] Name: [address] [place] [address]                               Patient Demographics    Patient Name   Modesto Tariq Legal Sex   Female    1990 Address   [facility] [address] [address] Phone   [phone] (Home)   [phone] (Mobile) *[facility] Account    Name Acct ID Class Status Primary Coverage   Modesto Tariq [id number] INPATIENT Billed [facility] MEDICAID          Guarantor Account (for Hospital Account [de-identified])    Name Relation to Pt Service Area Active? Acct Type   Krista Bonds Self [facility] Yes Personal/Family   Address Phone     [address]   [place] [address] [phone](Z)            Coverage Information (for Hospital Account [de-identified])    F/O Payor/Plan Subscriber  Subscriber Sex Precert #   BLUE CHOICE [place] [facility] [place]ID/[place] BLUE CHOICE [employer]AN MEDICAID [id number]    Subscriber Subscriber #   Krista Bonds [id number]   Grp # Group Name   [id number]    Address Phone   [address]   [address]    Policy Number Status Effective Date Benefits Phone   [id number] -  -   Auth/Cert   REF#           Admission Information    Arrival Date/Time: 2021 0022 Admit Date/Time: 2021 0022 IP Adm.  Date/Time: 2021 0132   Admission Type: Urgent Point of Origin: Physician Or Clinic Office Admit Category:    Means of Arrival:  Primary Service: Obstetrics Secondary Service: N/A   Transfer Source:  Service Area: [facility] Unit: [facility] 4 MOTHER INFANT   Admit Provider: Vera Burgos MD Attending Provider: Vera Burgos MD Referring Provider:    Admission Information    Attending Provider Admission Dx Admitted on    Normal labor, Pregnancy 21   Service Isolation Code Status   OBSTETRICS  Prior   Allergies Advance Care Planning    No Known Allergies Jump to the Activity     Admission Information    Unit/Bed: [facility] 4 MOTHER INFANT Service: OBSTETRICS   Admitting provider: Vera Burgos MD Phone: [phone]   Attending provider:  Phone:    PCP: None Phone:    Admission dx: labor Patient class: I   Admission type: UR     Patient Demographics    Patient Name   Kaity Stubbs   [id number] Legal Sex   Female    1990 Address   [facility] [address] [address] Phone   [phone] (Home)   [phone] (Mobile) *Preferred*   H&P Notes     H&P by Vera Burgos MD at 21 0120 documented on Admission (Discharged) from 2021 in [address]  Author: Becca Bland MD Author Type: Physician Filed: 21   Note Status: Signed Cosign: Cosign Not Required Date of Service: 21   : Becca Bland MD (Physician)              History & Physical     Name: Pinkey Schirmer MRN: [id number]  SSN: [id number]    YOB: 1990  Age: 33 y.o. Sex: female          Subjective: Pt is 32 y/o  at 39w4d who presents with SROM 2 hours ago and irregular and mild uterine ctx. Pt notes good FM. She denies VB, fevers, chills, UTI, PEC, or C-19 symptoms. Pt states that she has had SVDs , uncomplicated, and at term times 6. Pt's prenatal care has been in [place], [place]. Pt has her prenatal labs on her phone ([address]) and they are unremarkable other than anemia and an equivocal rubella titer.      Estimated Date of Delivery: 21                    OB History    Para Term  AB Living   8 7 7     7   SAB TAB Ectopic Molar Multiple Live Births                      # Outcome Date GA Lbr Gabino/2nd Weight Sex Delivery Anes PTL Lv   8 Current                     7 Term                     6 Term                     5 Term                     4 Term                     3 Term                     2 Term                     1 Term                               Please see prenatal records for details.     No past medical history on file. No past surgical history on file. Social History           Occupational History    Not on file   Tobacco Use    Smoking status: Not on file   Substance and Sexual Activity    Alcohol use: Not on file    Drug use: Not on file    Sexual activity: Not on file      No family history on file.     No Known Allergies          Prior to Admission medications    Medication Sig Start Date End Date Taking?  Authorizing Provider   PNV Comb #2-Iron-FA-Omega 3 29-1-400 mg cmpk Take  by mouth.     Yes Provider, Historical   Iron BisGl &PS Csk-Q-S64-FA-Ca 836-47-90-8 mg-mg-mcg-mg cap Take  by mouth.     Yes Provider, Historical         Review of Systems:  Constitutional:No headache, fever  Cardiac:   No chest pain      Resp: No cough or shortness of breath     GI:   No nausea/vomiting, diarrhea, abdominal pain    :   No dysuria  Neuro:     No vision changes, headache        Objective:      Vitals:       Vitals:     21 0056 21 0058   BP: 125/73     Pulse: 93     Resp: 20     Weight:   108 kg (238 lb)   Height:   5' 9\" (1.753 m)         Physical Exam:  Patient without distress. Heart: Regular rate and rhythm  Lung: clear to auscultation throughout lung fields, no wheezes, no rales, no rhonchi and normal respiratory effort  Back: costovertebral angle tenderness absent  Abdomen: soft, nontender  Fundus: soft and non tender  Cervical Exam: 2 cm dilated    50% effaced    -2 station    Presenting Part: cephalic  Lower Extremities:  - Edema No  Membranes:  Spontaneous Rupture of Membranes; Amniotic Fluid: clear fluid  Fetal Heart Rate: Baseline: 140 per minute  Variability: moderate  Accelerations: yes  Decelerations: variable  Uterine contractions: regular, every 4-5 minutes     Prenatal Labs:   No results found for: RUBELLAEXT, GRBSEXT, HBSAGEXT, HIVEXT, RPREXT, GONNOEXT, CHLAMEXT        Assessment/Plan:      Ms. Janene Morales is a G8 [id number] seen with pregnancy at 39w4d for Presumptive ROM .      GBS is positive     Plan:      Admit for labor management.  Start IV PCN per the GBS protocol.     Obtain pt's prenatal records.         Patient Demographics    Patient Name   Josseline Montes   [id number] Legal Sex   Female    1990 Address   [place] [address] [address] [address] Phone   [phone] (Home)   [phone] (Mobile) *Preferred*   CSN:   [id number]   Admit Date: Admit Time Room Bed   2021 12:22  [35] 01 [2173]   Attending Providers    Provider Pager From To   Janet Dia MD  21   Emergency Contact(s)    None on File       DELIVERY CLINICAL     Keya Gallo     MRN: [id number]   Link to Baby  Comment     Last edited by  on  at    Baby's name MRN Account  Age Sex Admission Type   Jarred Oliver [id number] 21 7 days F Confirmed Discharge   OB History       8    Para   7    Term   7            AB   1    Living   7      SAB        TAB   1    Ectopic        Molar        Multiple   0    Live Births   1         # Outcome Date GA Labor/2nd Weight Sex Delivery Anes PTL Lv A1 A5   1 Term                 2 Term                 3 Term                 4 Term                 5 Term                 6 Term                 7 TAB                 8 Term 21 39w4d 12h 54m / 0h 12m 2.96 kg F Vag-Spont Epidural N Living 9 9   Name: Storm Climes   Location: Other   Delivering Clinician: Bhavin Leggett MD      Prenatal History     Most Recent Value   Did You Receive Prenatal Care Yes   Name Of OB Provider NAMAN   Seen By MFM (Maternal Fetal Medicine)? No   Fetal Ultrasound Abnormalities/Concerns?  No   Infant Feeding Formula   Circumcision Planned No   Pediatrician After Birth/ Follow Up Baby Visits [facility] Medicine   Dating Summary    Working KYLIE: 21 set by Arielle Herman on 21 based on Other Basis   Based On KYLIE GA Diff GA User Date   Other Basis 21 Working  Arielle Herman 21   Prenatal Results    Prenatal Labs    Test Value Date Time   ABO/Rh      Antibody Scrn      Hgb      Hct      Platelets      Rubella      RPR      T. Pallidum Antibody      Urine      Hep B Surf Ag      Hep C      HIV      Gonorrhea      Chlamydia      TSH      GTT, 1 HR (Glucola)      GTT, Fasting      GTT, 1 HR      GTT, 2 HR      GTT, 3 HR      3rd Trimester    Test Value Date Time   Hgb      Hct      Platelets      Group B Strep      Antibody Scrn      TSH      T. Pallidum Antibody      Hep B Surf Ag      Gonorrhea      Chlamydia Screening/Diagnostics    Test Value Date Time   AFP Only      AFP Tetra      Hgb Electrophoresis      Amniocentesis      Cystic Fibrosis      Thalessemia      Barry-Sachs      Canavan      PAP Smear      Beta HCG      NT      NIPT      COVID-19      Lab    Test Value Date Time   GTT, Fasting      GTT, 1HR      GTT, 2HR      GTT, 3HR      RPR      Beta HCG      CMV Ab      Toxoplasma Ab      Varicella Zoster Ab         Legend    *: Historical  View all results for this pregnancy   Juan Pablo Hodge [[id number]]     Delivery    Birth date/time: 2021 11:36:00  Delivery type: Vaginal, Spontaneous  Labor Event Times    Labor onset date/time: 2021  Dilation complete date/time: 2021 1124  Labor Events     labor?: No   steroids?: None  Cervical ripening type: None  Antibiotics during labor?: Yes  Rupture date/time: 2021  Rupture type: SROM  Fluid color: Clear  Fluid odor: None  Delivery Providers    Delivering clinician: Krys Landeros MD  Provider Role   Jorge Hudson MD Obstetrician   Cheryl Wright, RN Primary Nurse   Giselle Peters, RN Staff Nurse   Trung Buckley, RN Charge Nurse   John WILLARD [facility]   Anesthesia    Method: Epidural  Multiple Birth Onset Second Stage    Second Stage Start Date: 21 Second Stage Start Time: 2456 EDT   Operative Delivery    Forceps attempted?: No  Vacuum extractor attempted?: No  Presentation    Presentation: Vertex  Apgars    Living status: Living  Apgars:  1 min. :  5 min.:  10 min. :  15 min.:  20 min.:    Skin color:  1  1       Heart rate:  2  2       Reflex irritability:  2  2       Muscle tone:  2  2       Respiratory effort:  2  2       Total:  9  9       Apgars assigned by: Delmy Pabon RN  Resuscitation    Method: Suctioning-bulb, Tactile Stimulation  Shoulder Dystocia    Shoulder dystocia present?: No  Measurements    Weight: 2960 g  Weight (lbs): 6 lb 8.4 oz  Length: 50 cm  Length (in): 19.69\"  Head circumference: 34 cm  Chest circumference: 32 cm  Lacerations    No data filed   Placenta    Placenta Date/Time: 2021 1139  Removal: Spontaneous  Appearance: Normal Placenta disposition: Discarded   Vaginal Counts    Initial count personnel: CASSIA/AMIRAH  Final count personnel: CASSIA/AMIRAH  Accurate final count?: Yes  Delivery Summary History    Event User Date/Time   Signed Chin Cox RN 2021 14:15:00       DISCHARGE   Patient Demographics    Patient Name   Monica Gonzalez   [id number] Legal Sex   Female    1990 Address   [place] [address] [address] Phone   [phone] (Home)   [phone] (Mobile) *Preferred*   Discharge Information    Discharge Provider Date/Time Disposition Shiv Joyce MD / [phone] 21 1459 Home or [address]   Comments   (none)   Discharge Summary Notes    Discharge Summary by Bernie Babinski, MD at 21 1156  Version 1 of 1  Author: Bernie Babinski, MD Service: Obstetrics & Gynecology Author Type: Physician   Filed: 21 1200 Date of Service: 21 1156 Status: Signed   : Bernie Babinski, MD (Physician)         Antepartum Discharge Summary      Name: Christine Holguin MRN: [id number]  SSN: [id number]    YOB: 1990  Age: 33 y.o.   Sex: female       Allergies: Patient has no known allergies.     Admit Date: 2021     Discharge Date: 2021      Admitting Physician: Jorge Guerrero MD      Attending Physician:  Sunday Haynes MD      * Admission Diagnoses: Normal labor [O80, Z37.9]  Pregnancy [Z34.90]     * Discharge Diagnoses:              Hospital Problems as of 2021 Date Reviewed: 2021         Codes Class Noted - Resolved POA     * (Principal) Normal labor ICD-10-CM: O80, Z37.9  ICD-9-CM: 520   2021 - Present Unknown           Pregnancy ICD-10-CM: Z34.90  ICD-9-CM: V22.2   2021 - Present Unknown           Anemia affecting eighth pregnancy (Chronic) ICD-10-CM: O99.019, O09.40  ICD-9-CM: 648.23   8/1/2021 - Present Yes                      Lab Results   Component Value Date/Time     ABO/Rh(D) O POSITIVE 07/30/2021 01:41 AM    There is no immunization history for the selected administration types on file for this patient.     * Discharge Condition: good     * Procedures: vaginal delivery without complications        * Hospital Course:    - uncomplicated delivery     * Disposition: Home     Discharge Medications:       Current Discharge Medication List           CONTINUE these medications which have NOT CHANGED     Details   PNV Comb #2-Iron-FA-Omega 3 29-1-400 mg cmpk Take  by mouth.       Iron BisGl &PS Lim-W-P97-FA-Ca 794-21-10-8 mg-mg-mcg-mg cap Take  by mouth.                 * Follow-up Care/Patient Instructions:   Activity: Activity as tolerated and No sex for 4 weeks  Diet: Regular Diet  Wound Care: None needed              Follow-up Information      Follow up With Specialties Details Why Contact Info     Matilda Song DO Obstetrics & Gynecology, Gynecology, Obstetrics Call in 6 weeks   [address] [facility]  [phone]        None       None ([phone]) Patient stated that they have no PCP